# Patient Record
Sex: MALE | Race: BLACK OR AFRICAN AMERICAN
[De-identification: names, ages, dates, MRNs, and addresses within clinical notes are randomized per-mention and may not be internally consistent; named-entity substitution may affect disease eponyms.]

---

## 2019-04-16 ENCOUNTER — HOSPITAL ENCOUNTER (EMERGENCY)
Dept: HOSPITAL 61 - ER | Age: 44
Discharge: HOME | End: 2019-04-16
Payer: MEDICARE

## 2019-04-16 VITALS — BODY MASS INDEX: 25.18 KG/M2 | HEIGHT: 73 IN | WEIGHT: 190 LBS

## 2019-04-16 VITALS — SYSTOLIC BLOOD PRESSURE: 129 MMHG | DIASTOLIC BLOOD PRESSURE: 88 MMHG

## 2019-04-16 DIAGNOSIS — H61.23: Primary | ICD-10-CM

## 2019-04-16 PROCEDURE — 69209 REMOVE IMPACTED EAR WAX UNI: CPT

## 2019-04-16 PROCEDURE — 99282 EMERGENCY DEPT VISIT SF MDM: CPT

## 2019-04-16 NOTE — PHYS DOC
Adult General


Chief Complaint


Chief Complaint:  EARACHE/EAR PAIN





HPI


HPI





Patient is a 43  year old male presents to the ED complaining of left ear pain/

fullness 1 week. Patient states he's has had ear issues on and off for the 

last 3 years. States he knows he has quite a bit of earwax. States he feels 

like he has decreased hearing on the left side due to the earwax. States he can 

hear fine out of the right ear. Denies fever, history of ear infections, nausea/

vomiting, or pressure changes, headache, neck pain, tinnitus or sore throat.





Review of Systems


Review of Systems





Constitutional: Denies fever or chills []


Eyes: Denies change in visual acuity, redness, or eye pain []


HENT: Denies nasal congestion or sore throat []


Respiratory: Denies cough or shortness of breath []


Cardiovascular: No additional information not addressed in HPI []


GI: Denies abdominal pain, nausea, vomiting, bloody stools or diarrhea []


: Denies dysuria or hematuria []


Musculoskeletal: Denies back pain or joint pain []


Integument: Denies rash or skin lesions []


Neurologic: Denies headache, focal weakness or sensory changes []





All other systems were reviewed and found to be within normal limits, except as 

documented in this note.





Physical Exam


Physical Exam





Constitutional: Well developed, well nourished, no acute distress, non-toxic 

appearance. []


HENT: Normocephalic, atraumatic, mild bilateral cerumen impaction. whisper 

hearing test intact, oropharynx moist, no oral exudates, nose normal. []


Eyes: PERRLA, EOMI, conjunctiva normal, no discharge. [] 


Neck: Normal range of motion, no tenderness, supple, no stridor. [] 


Cardiovascular:Heart rate regular rhythm, no murmur []


Lungs & Thorax:  Bilateral breath sounds clear to auscultation []


Skin: Warm, dry, no erythema, no rash. [] 


Neurologic: Alert and oriented X 3, normal motor function, normal sensory 

function, no focal deficits noted. []


Psychologic: Affect normal, judgement normal, mood normal. []





Current Patient Data


Vital Signs





 Vital Signs








  Date Time  Temp Pulse Resp B/P (MAP) Pulse Ox O2 Delivery O2 Flow Rate FiO2


 


4/16/19 21:45 98.7 89 16 129/88 (102) 97 Room Air  





 98.7       











EKG


EKG


[]





Radiology/Procedures


Radiology/Procedures


[]





Course & Med Decision Making


Course & Med Decision Making


Pertinent Labs and Imaging studies reviewed. (See chart for details)





Cerumen removed with irrigation. No complications. Patient states he is feeling 

much better. Discussed symptomatic treatment and follow-up outpatient. 

Discussed reasons to return to the ED. Patient understands and agrees with plan.





Dragon Disclaimer


Dragon Disclaimer


This electronic medical record was generated, in whole or in part, using a 

voice recognition dictation system.





Departure


Departure


Impression:  


 Primary Impression:  


 Cerumen impaction


Disposition:  01 HOME, SELF-CARE


Condition:  IMPROVED


Referrals:  


LYDIA NIELSON MD, MICHAEL M MD


Patient Instructions:  Cerumen Impaction











FARNAZ WINKLER Apr 16, 2019 21:54